# Patient Record
(demographics unavailable — no encounter records)

---

## 2025-03-03 NOTE — PHYSICAL EXAM
[TextEntry] : PHYSICAL EXAM  General: The patient was alert, oriented and in no distress. Voice was clear.  Face: The patient had no facial asymmetry or mass. The skin was unremarkable.  Ears: Hearing normal to conversational voice External ears were normal without deformity. Ear canals were clear. No cerumen or inflammation Tympanic membranes were intact and normal. No perforation or effusion. mobile  Nose:  The external nose had no significant deformity.   . On anterior rhinoscopy, the nasal mucosa was clear.  The anterior septum was grossly midline. There were no visualized polyps, purulence  or masses.   Oral cavity: Oral mucosa- normal. Oral and base of tongue- clear and without mass. Gingival and buccal mucosa- moist and without lesions. Palate- the palate moved well. There was no cleft palate. There appeared to be good salivary flow.   Oral cavity/oropharynx- no pus, erythema or mass 1+ cryptic tonsils  Neck:  The neck was symmetrical. The parotid and submandibular glands were normal without masses. The trachea was midline and there was no unusual crepitus. Thyroid was smooth and nontender and no masses were palpated. No masses  Lymphatics: Cervical adenopathy- none.     PROCEDURE:  FLEXIBLE LARYNGOSCOPY, NASAL ENDOSCOPY   Surgeon: Dr. Sy Indication: Reflux, dysphagia, inadequate exam/inability tolerate transoral exam Anesthetic: Topical lidocaine and Afrin Procedure: The patient was placed in a sitting position.  Following application of the topical anesthetic and decongestant, exam was performed with a flexible telescope.  The scope was passed along the right nasal floor to the nasopharynx.  It was then passed into the region of the middle meatus, middle turbinate, and sphenoethmoid region.  An identical procedure was performed on the left side.  The following findings were noted:  Nasal mucosa: Mild edema Septum: Wavy  Right nasal cavity      Inferior turbinate: Normal      Middle turbinate: normal      Superior turbinate: normal      Inferior meatus: no pus, polyps or congestion      Middle meatus:  no pus, polyps or congestion       Superior meatus:  no pus, polyps, or congestion      Sphenoethmoidal recess: no pus, polyps or congestion   Left nasal cavity      Inferior turbinate: Normal      Middle turbinate: normal      Superior turbinate: normal      Inferior meatus: no pus, polyps or congestion      Middle meatus: no pus, polyps, or congestion      Superior meatus:  no pus, polyps, or congestion      Sphenoethmoidal recess: no pus, polyps or congestion   Nasopharynx: no masses, choanae patent, no adenoid tissue  Base of tongue and vallecula: no masses or asymmetry Posterior pharyngeal wall: no masses.  Hypopharynx: symmetrical. No masses Pyriform sinuses: no lesions or pooling of secretions Epiglottis: normal. No edema or lesions Aryepiglottic folds: normal. No lesions.  True vocal cords: clear and mobile. No lesions. Airway patent False vocal cords: normal Ventricles: no masses.  Arytenoids: Mild edema and erythema Interarytenoid area: no masses.  Mild edema and erythema Subglottis: normal. no masses

## 2025-03-03 NOTE — ASSESSMENT
[FreeTextEntry1] :  She has a chronic postnasal drip.  She has a history of vasomotor rhinitis.  She is not using medications.  She has also had a history of dysphagia.  This may be related to reflux.  She had laryngeal changes consistent with reflux on flexible laryngoscopy.  She has a history of tonsil stones  Plan -Findings and management options were discussed with the patient. - She was given literature regarding management of postnasal drip - Hydration and humidification recommended - We discussed management for tonsil stones.  I typically recommend conservative management.  If she is interested in possible laser tonsillectomy, I have given her the name of my colleague, Dr. Cutler, who performs the procedure - Reflux precautions recommended.  She is given literature.  She will continue medication for reflux - GI evaluation recommended - I will send her for a modified barium swallow for further evaluation for dysphagia - Nasal saline irrigations as needed - Trial of Atrovent nasal spray - Follow-up after the modified barium swallow - Call return earlier if any problems or worsening symptoms
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
